# Patient Record
Sex: MALE | Race: WHITE
[De-identification: names, ages, dates, MRNs, and addresses within clinical notes are randomized per-mention and may not be internally consistent; named-entity substitution may affect disease eponyms.]

---

## 2015-11-06 VITALS
DIASTOLIC BLOOD PRESSURE: 87 MMHG | SYSTOLIC BLOOD PRESSURE: 148 MMHG | SYSTOLIC BLOOD PRESSURE: 148 MMHG | DIASTOLIC BLOOD PRESSURE: 87 MMHG

## 2019-01-04 ENCOUNTER — HOSPITAL ENCOUNTER (OUTPATIENT)
Dept: HOSPITAL 63 - US | Age: 39
Discharge: HOME | End: 2019-01-04
Attending: FAMILY MEDICINE
Payer: MEDICARE

## 2019-01-04 DIAGNOSIS — G45.8: Primary | ICD-10-CM

## 2019-01-04 PROCEDURE — 93880 EXTRACRANIAL BILAT STUDY: CPT

## 2019-01-04 NOTE — RAD
EXAM: Carotid Doppler sonogram.

 

HISTORY: Transient ischemic attack.

 

TECHNIQUE: Gray scale and color Doppler sonographic evaluation of the neck

with spectral waveform analysis was performed and static images are 

submitted for review. 

 

FINDINGS: The peak systolic velocity within the right common carotid 

artery is 158 cm/sec. The peak systolic velocity within the right internal

carotid artery is 72 cm/sec and the end diastolic velocity within the 

right internal carotid artery is 26 cm/sec. The right ICA/CCA ratio is 

0.63.

 

The peak systolic velocity within the left common carotid artery is 144 

cm/sec. The peak systolic velocity within the left internal carotid artery

is 102 cm/sec and the end diastolic velocity within the left internal 

carotid artery is 31 cm/sec. The left ICA/CCA ratio is 0.84.

 

There is normal antegrade flow within both vertebral arteries.

 

IMPRESSION: No Doppler evidence of hemodynamically significant stenosis 

within the carotid or vertebral arteries.

 

 

PQRS Compliance Statement - Stenosis calculations for CT, MR and 

conventional angiography are based upon measurement of the distal ICA 

diameter in accordance with the NASCET methodology.  Stenosis calculations

for carotid ultrasound studies are derived from validated velocity 

criteria which are known to correlate with the NASCET methodology.

 

Electronically signed by: Cleo Vazquez MD (1/4/2019 3:07 PM) [facility]

## 2020-01-13 ENCOUNTER — HOSPITAL ENCOUNTER (OUTPATIENT)
Dept: HOSPITAL 63 - US | Age: 40
Discharge: HOME | End: 2020-01-13
Attending: FAMILY MEDICINE
Payer: MEDICARE

## 2020-01-13 DIAGNOSIS — L03.314: ICD-10-CM

## 2020-01-13 DIAGNOSIS — N50.3: Primary | ICD-10-CM

## 2020-01-13 DIAGNOSIS — N43.3: ICD-10-CM

## 2020-01-13 PROCEDURE — 76870 US EXAM SCROTUM: CPT

## 2021-11-20 ENCOUNTER — HOSPITAL ENCOUNTER (EMERGENCY)
Dept: HOSPITAL 63 - ER | Age: 41
Discharge: HOME | End: 2021-11-20
Payer: MEDICARE

## 2021-11-20 VITALS — SYSTOLIC BLOOD PRESSURE: 116 MMHG | DIASTOLIC BLOOD PRESSURE: 81 MMHG

## 2021-11-20 VITALS — BODY MASS INDEX: 20.51 KG/M2 | WEIGHT: 168.43 LBS | HEIGHT: 76 IN

## 2021-11-20 DIAGNOSIS — L03.114: Primary | ICD-10-CM

## 2021-11-20 DIAGNOSIS — Z20.822: ICD-10-CM

## 2021-11-20 LAB
ALBUMIN SERPL-MCNC: 2.8 G/DL (ref 3.4–5)
ALBUMIN/GLOB SERPL: 0.6 {RATIO} (ref 1–1.7)
ALP SERPL-CCNC: 72 U/L (ref 46–116)
ALT SERPL-CCNC: 52 U/L (ref 16–63)
ANION GAP SERPL CALC-SCNC: 6 MMOL/L (ref 6–14)
AST SERPL-CCNC: 42 U/L (ref 15–37)
BASOPHILS # BLD AUTO: 0 X10^3/UL (ref 0–0.2)
BASOPHILS NFR BLD: 1 % (ref 0–3)
BILIRUB SERPL-MCNC: 0.3 MG/DL (ref 0.2–1)
BUN/CREAT SERPL: 20 (ref 6–20)
CA-I SERPL ISE-MCNC: 12 MG/DL (ref 8–26)
CALCIUM SERPL-MCNC: 8.7 MG/DL (ref 8.5–10.1)
CHLORIDE SERPL-SCNC: 102 MMOL/L (ref 98–107)
CO2 SERPL-SCNC: 28 MMOL/L (ref 21–32)
CREAT SERPL-MCNC: 0.6 MG/DL (ref 0.7–1.3)
EOSINOPHIL NFR BLD: 0.1 X10^3/UL (ref 0–0.7)
EOSINOPHIL NFR BLD: 2 % (ref 0–3)
ERYTHROCYTE [DISTWIDTH] IN BLOOD BY AUTOMATED COUNT: 12.7 % (ref 11.5–14.5)
GFR SERPLBLD BASED ON 1.73 SQ M-ARVRAT: 147 ML/MIN
GLOBULIN SER-MCNC: 4.4 G/DL (ref 2.2–3.8)
GLUCOSE SERPL-MCNC: 119 MG/DL (ref 70–99)
HCT VFR BLD CALC: 37 % (ref 39–53)
HGB BLD-MCNC: 12.3 G/DL (ref 13–17.5)
LYMPHOCYTES # BLD: 1.1 X10^3/UL (ref 1–4.8)
LYMPHOCYTES NFR BLD AUTO: 22 % (ref 24–48)
MCH RBC QN AUTO: 30 PG (ref 25–35)
MCHC RBC AUTO-ENTMCNC: 33 G/DL (ref 31–37)
MCV RBC AUTO: 91 FL (ref 79–100)
MONO #: 0.5 X10^3/UL (ref 0–1.1)
MONOCYTES NFR BLD: 10 % (ref 0–9)
NEUT #: 3.1 X10^3UL (ref 1.8–7.7)
NEUTROPHILS NFR BLD AUTO: 65 % (ref 31–73)
PLATELET # BLD AUTO: 206 X10^3/UL (ref 140–400)
POTASSIUM SERPL-SCNC: 3.9 MMOL/L (ref 3.5–5.1)
PROT SERPL-MCNC: 7.2 G/DL (ref 6.4–8.2)
RBC # BLD AUTO: 4.05 X10^6/UL (ref 4.3–5.7)
SODIUM SERPL-SCNC: 136 MMOL/L (ref 136–145)
WBC # BLD AUTO: 4.8 X10^3/UL (ref 4–11)

## 2021-11-20 PROCEDURE — 85025 COMPLETE CBC W/AUTO DIFF WBC: CPT

## 2021-11-20 PROCEDURE — 87040 BLOOD CULTURE FOR BACTERIA: CPT

## 2021-11-20 PROCEDURE — 87426 SARSCOV CORONAVIRUS AG IA: CPT

## 2021-11-20 PROCEDURE — 80053 COMPREHEN METABOLIC PANEL: CPT

## 2021-11-20 PROCEDURE — C9803 HOPD COVID-19 SPEC COLLECT: HCPCS

## 2021-11-20 PROCEDURE — U0003 INFECTIOUS AGENT DETECTION BY NUCLEIC ACID (DNA OR RNA); SEVERE ACUTE RESPIRATORY SYNDROME CORONAVIRUS 2 (SARS-COV-2) (CORONAVIRUS DISEASE [COVID-19]), AMPLIFIED PROBE TECHNIQUE, MAKING USE OF HIGH THROUGHPUT TECHNOLOGIES AS DESCRIBED BY CMS-2020-01-R: HCPCS

## 2021-11-20 PROCEDURE — 99283 EMERGENCY DEPT VISIT LOW MDM: CPT

## 2021-11-20 PROCEDURE — 83605 ASSAY OF LACTIC ACID: CPT

## 2021-11-20 PROCEDURE — 36415 COLL VENOUS BLD VENIPUNCTURE: CPT

## 2021-11-20 NOTE — PHYS DOC
Past History


Past Medical History:  UTI


Additional Past Medical Histor:  sepsis, paraplegia


 (LAUREN BARTH APRN)





General Adult


EDM:


Chief Complaint:  CELLULITIS





HPI:


HPI:





Patient is a 41-year-old male that presents today with a left elbow cellulitis. 

Patient was seen on Wednesday at his primary care doctor's office, Dr. Braden, for a raised area that was concerning for cellulitis he was placed 

on oral antibiotics in the office. Patient was reseen today in the office and 

the redness had gotten worse patient stated he also had an open wound there that

was draining, patient was sent to the emergency department from the primary 

care's office for evaluation and treatment of cellulitis and admission. Patient 

was informed by this APRN that Dr. Boyce's office had called and is 

requesting the patient be admitted for IV antibiotics of vancomycin and then 

patient will have a PICC line placed on Monday for outpatient infusion therapy. 

Patient at this time is not wanting to be admitted overnight through the weekend

for IV antibiotics. Patient would like a PICC line placed at this time, there is

no resources at this time for PICC line placement. Talk with Dr. Braden he 

states if patient does not request to be admission then patient to get 

daptomycin in the emergency department today and then come back to the emergency

department tomorrow for dose of daptomycin and then on Monday have a PICC line 

placed. We will spoke to patient regarding plan of care


 (LAUREN BARTH APRN)





Review of Systems:


Review of Systems:


Constitutional:   fever or chills 


Eyes:  Denies change in visual acuity 


HENT:  Denies nasal congestion or sore throat 


Respiratory:  Denies cough or shortness of breath 


Cardiovascular:  Denies chest pain or edema 


GI:  Denies abdominal pain, nausea, vomiting, bloody stools or diarrhea 


: Denies dysuria 


Musculoskeletal: Left arm redness, swelling, pain


Integument:  Denies rash 


Neurologic:  Denies headache, focal weakness or sensory changes 


Endocrine:  Denies polyuria or polydipsia 


Lymphatic:  Denies swollen glands 


Psychiatric:  Denies depression or anxiety


 (LAUREN BARTH APRN)





Allergies:


Allergies:





Allergies








Coded Allergies Type Severity Reaction Last Updated Verified


 


  I S O L A T I O N *CONTACT* Allergy Unknown  11/4/15 No


 


  NKMA Allergy Unknown  11/4/15 No








 (LAUREN BARTH)





Physical Exam:


PE:





Constitutional: Patient is sitting in motorized wheelchair, paraplegic patient 

has use of his upper extremities, nontoxic in appearance


HENT: Normocephalic, atraumatic, bilateral external ears normal, oropharynx 

moist, no oral exudates, nose normal. []


Eyes: PERRLA, EOMI, conjunctiva normal, no discharge. [] 


Neck: Normal range of motion, no tenderness, supple, no stridor. [] 


Cardiovascular:Heart rate regular rhythm, no murmur []


Lungs & Thorax:  Bilateral breath sounds clear to auscultation []


Abdomen: Bowel sounds normal, soft, no tenderness, no masses, no pulsatile 

masses. [] 


Skin: Warm, dry, no erythema, no rash. [] 


Back: No tenderness, no CVA tenderness. [] 


Extremities: Left elbow swollen with a 2 cm x 2 cm open wound noted, no drainage

 at this time noted, elbow is swollen and tender to touch and is noted into the 

forearm with warmth noted, cap refill and neurovascular distal to the swelling 

is intact


Neurologic: Alert and oriented X 3, normal motor function, normal sensory 

function, no focal deficits noted. []


Psychologic: Affect normal, judgement normal, mood normal. []


 (LAUREN BARTH)





Current Patient Data:


Labs:





Laboratory Tests








Test


 11/20/21


14:25


 


White Blood Count 4.8 x10^3/uL 


 


Red Blood Count 4.05 x10^6/uL 


 


Hemoglobin 12.3 g/dL 


 


Hematocrit 37.0 % 


 


Mean Corpuscular Volume 91 fL 


 


Mean Corpuscular Hemoglobin 30 pg 


 


Mean Corpuscular Hemoglobin


Concent 33 g/dL 





 


Red Cell Distribution Width 12.7 % 


 


Platelet Count 206 x10^3/uL 


 


Neutrophils (%) (Auto) 65 % 


 


Lymphocytes (%) (Auto) 22 % 


 


Monocytes (%) (Auto) 10 % 


 


Eosinophils (%) (Auto) 2 % 


 


Basophils (%) (Auto) 1 % 


 


Neutrophils # (Auto) 3.1 x10^3uL 


 


Lymphocytes # (Auto) 1.1 x10^3/uL 


 


Monocytes # (Auto) 0.5 x10^3/uL 


 


Eosinophils # (Auto) 0.1 x10^3/uL 


 


Basophils # (Auto) 0.0 x10^3/uL 


 


Sodium Level 136 mmol/L 


 


Potassium Level 3.9 mmol/L 


 


Chloride Level 102 mmol/L 


 


Carbon Dioxide Level 28 mmol/L 


 


Anion Gap 6 


 


Blood Urea Nitrogen 12 mg/dL 


 


Creatinine 0.6 mg/dL 


 


Estimated GFR


(Cockcroft-Gault) 147.0 





 


BUN/Creatinine Ratio 20 


 


Glucose Level 119 mg/dL 


 


Lactic Acid Level 0.8 mmol/L 


 


Calcium Level 8.7 mg/dL 


 


Total Bilirubin 0.3 mg/dL 


 


Aspartate Amino Transf


(AST/SGOT) 42 U/L 





 


Alanine Aminotransferase


(ALT/SGPT) 52 U/L 





 


Alkaline Phosphatase 72 U/L 


 


Total Protein 7.2 g/dL 


 


Albumin 2.8 g/dL 


 


Albumin/Globulin Ratio 0.6 


 


SARS-CoV-2 Antigen (Rapid) Negative 








Vital Signs:





Vital Signs








  Date Time  Temp Pulse Resp B/P (MAP) Pulse Ox O2 Delivery O2 Flow Rate FiO2


 


11/20/21 14:32 98.7 86 18 116/81 (93) 99   








 (LAUREN BARTH)





EKG:


EKG:


[]


 (LAUREN BARTH)





Radiology/Procedures:


Radiology/Procedures:


[]


 (LAUREN BARTH)





Heart Score:


C/O Chest Pain:  N/A


Risk Factors:


Risk Factors:  DM, Current or recent (<one month) smoker, HTN, HLP, family 

history of CAD, obesity.


Risk Scores:


Score 0 - 3:  2.5% MACE over next 6 weeks - Discharge Home


Score 4 - 6:  20.3% MACE over next 6 weeks - Admit for Clinical Observation


Score 7 - 10:  72.7% MACE over next 6 weeks - Early Invasive Strategies


 (LAUREN BARTH)





Course & Med Decision Making:


Course & Med Decision Making


Pertinent Labs and Imaging studies reviewed. (See chart for details)





1505 call Dr. Braden reported the lab findings to him he wishes for patient 

to receive a dose of daptomycin IV while here in the emergency department, 

patient does not want to be admitted to the hospital patient is okay per Dr. Braden to go home and to return on Sunday for another dose of daptomycin.  

Patient informed of Dr. Braden's request





0105 was contacted by nursing administrator via the nursing supervisor patient 

will be unable to receive IV infusion of daptomycin here in the emergency 

department.  Spoke to Dr. Braden again on the phone he states to give oral 

antibiotics on an outpatient basis and have patient follow-up in the office on 

Monday.  Patient is to return to the emergency department for increased swelling

 and pain, fever, or inability to take medications


 (LAUREN BARTH)





Venita Disclaimer:


Dragon Disclaimer:


This electronic medical record was generated, in whole or in part, using a voice

 recognition dictation system.


 (LAUREN BARTH)


Attending Co-Sign


The patient was seen and interviewed as well as examined at the bedside. The 

chart was reviewed. The case was discussed. Agree with the plan of care.


 (JEFFERY MANJARREZ DO)





Departure


Departure:


Impression:  


   Primary Impression:  


   Cellulitis


   Qualified Codes:  L03.114 - Cellulitis of left upper limb


Disposition:  01 HOME / SELF CARE / HOMELESS


Condition:  STABLE


Referrals:  


RAMY LEWIS MD (PCP)


Patient Instructions:  Cellulitis





Additional Instructions:  


Continue the Keflex


Take Bactrim 1 tablet twice daily


Follow-up on Monday with Dr. Braden's office for further management of your 

cellulites


Return to the emergency department for increased pain, increased swelling, 

increased redness, increased fever not managed with Tylenol and/or ibuprofen, or

 any other concerns you may have related to the cellulitis


Scripts


Sulfamethoxazole/Trimethoprim (BACTRIM DS TABLET) 1 Each Tablet


1 TAB PO BID for cellulitits for 10 Days, #20 TAB 0 Refills


   Prov: LAUREN BARTH         11/20/21











LAUREN BARTH       Nov 20, 2021 14:52


JEFFERY MANJARREZ DO                 Nov 21, 2021 07:23